# Patient Record
Sex: MALE | Race: WHITE
[De-identification: names, ages, dates, MRNs, and addresses within clinical notes are randomized per-mention and may not be internally consistent; named-entity substitution may affect disease eponyms.]

---

## 2019-09-17 ENCOUNTER — HOSPITAL ENCOUNTER (EMERGENCY)
Dept: HOSPITAL 96 - M.ERS | Age: 79
Discharge: HOME | End: 2019-09-17
Payer: COMMERCIAL

## 2019-09-17 VITALS — HEIGHT: 69 IN | WEIGHT: 203 LBS | BODY MASS INDEX: 30.07 KG/M2

## 2019-09-17 VITALS — SYSTOLIC BLOOD PRESSURE: 146 MMHG | DIASTOLIC BLOOD PRESSURE: 57 MMHG

## 2019-09-17 DIAGNOSIS — R51: Primary | ICD-10-CM

## 2019-09-17 LAB
ABSOLUTE BASOPHILS: 0.1 THOU/UL (ref 0–0.2)
ABSOLUTE EOSINOPHILS: 0.6 THOU/UL (ref 0–0.7)
ABSOLUTE MONOCYTES: 1 THOU/UL (ref 0–1.2)
ALBUMIN SERPL-MCNC: 3.6 G/DL (ref 3.4–5)
ALP SERPL-CCNC: 73 U/L (ref 46–116)
ALT SERPL-CCNC: 34 U/L (ref 30–65)
ANION GAP SERPL CALC-SCNC: 11 MMOL/L (ref 7–16)
APTT BLD: 30.8 SECONDS (ref 25–31.3)
AST SERPL-CCNC: 14 U/L (ref 15–37)
BASOPHILS NFR BLD AUTO: 1.1 %
BILIRUB SERPL-MCNC: 0.2 MG/DL
BUN SERPL-MCNC: 16 MG/DL (ref 7–18)
CALCIUM SERPL-MCNC: 8.8 MG/DL (ref 8.5–10.1)
CHLORIDE SERPL-SCNC: 104 MMOL/L (ref 98–107)
CK-MB MASS: 1.2 NG/ML
CO2 SERPL-SCNC: 26 MMOL/L (ref 21–32)
CREAT SERPL-MCNC: 1 MG/DL (ref 0.6–1.3)
EOSINOPHIL NFR BLD: 6.8 %
GLUCOSE SERPL-MCNC: 90 MG/DL (ref 70–99)
GRANULOCYTES NFR BLD MANUAL: 46.5 %
HCT VFR BLD CALC: 42.5 % (ref 42–52)
HGB BLD-MCNC: 14.7 GM/DL (ref 14–18)
INR PPP: 1
LYMPHOCYTES # BLD: 2.8 THOU/UL (ref 0.8–5.3)
LYMPHOCYTES NFR BLD AUTO: 33.3 %
MCH RBC QN AUTO: 30.7 PG (ref 26–34)
MCHC RBC AUTO-ENTMCNC: 34.6 G/DL (ref 28–37)
MCV RBC: 88.8 FL (ref 80–100)
MONOCYTES NFR BLD: 12.3 %
MPV: 9.6 FL. (ref 7.2–11.1)
NEUTROPHILS # BLD: 3.8 THOU/UL (ref 1.6–8.1)
NT-PRO BRAIN NAT PEPTIDE: 159 PG/ML (ref ?–300)
NUCLEATED RBCS: 0 /100WBC
PLATELET COUNT*: 230 THOU/UL (ref 150–400)
POTASSIUM SERPL-SCNC: 4.2 MMOL/L (ref 3.5–5.1)
PROT SERPL-MCNC: 7.4 G/DL (ref 6.4–8.2)
PROTHROMBIN TIME: 10.7 SECONDS (ref 9.2–11.5)
RBC # BLD AUTO: 4.79 MIL/UL (ref 4.5–6)
RDW-CV: 14.2 % (ref 10.5–14.5)
SODIUM SERPL-SCNC: 141 MMOL/L (ref 136–145)
TROPONIN-I LEVEL: <0.06 NG/ML (ref ?–0.06)
WBC # BLD AUTO: 8.3 THOU/UL (ref 4–11)

## 2019-09-18 NOTE — EKG
[place] [address]
Phone:  [phone]                     ELECTROCARDIOGRAM REPORT      
_______________________________________________________________________________
 
Name:       SERA VIEIRA                   [id number]      Account #:      [id number]  
Admission:  19     Attend Phys:                         
Discharge:  19     Date of Birth:  [birth year]  
         Report #: [id number]
    [id number]
_______________________________________________________________________________
THIS REPORT FOR:  //name//                      
 
                         [facility] ED
                                       
Test Date:    2019               Test Time:    17:18:04
Pat Name:     SERA VIEIRA             Department:   
Patient ID:   [id number]            Room:          
Gender:       M                        Technician:   
:          [id number]               Requested By: Simon Duran
Order Number: [id number] MD:   Clovis Germain
                                 Measurements
Intervals                              Axis          
Rate:         69                       P:            -11
FL:           135                      QRS:          -7
QRSD:         94                       T:            14
QT:           407                                    
QTc:          436                                    
                           Interpretive Statements
Sinus rhythm
Supraventricular bigeminy
Low voltage, precordial leads
Abnormal R-wave progression, early transition
No previous ECG available for comparison
 
Electronically Signed On 2019 10:13:08 CDT by Clovis Germain
[email or web address]
 
 
 
 
 
 
 
 
 
 
 
 
 
 
 
 
 
  <ELECTRONICALLY SIGNED>
                                           By: Clovis Germain MD, [facility]      
  19     1013
D: 19 1718   _____________________________________
T: 19 1718   Clovis Germain MD, FAC        /EPI normal... Well appearing, well nourished, awake, alert, oriented to person, place, time/situation and in no apparent distress.

## 2020-02-15 ENCOUNTER — HOSPITAL ENCOUNTER (INPATIENT)
Dept: HOSPITAL 96 - M.ERS | Age: 80
LOS: 2 days | Discharge: HOME HEALTH | DRG: 195 | End: 2020-02-17
Attending: INTERNAL MEDICINE | Admitting: INTERNAL MEDICINE
Payer: COMMERCIAL

## 2020-02-15 VITALS — SYSTOLIC BLOOD PRESSURE: 150 MMHG | DIASTOLIC BLOOD PRESSURE: 83 MMHG

## 2020-02-15 VITALS — SYSTOLIC BLOOD PRESSURE: 137 MMHG | DIASTOLIC BLOOD PRESSURE: 77 MMHG

## 2020-02-15 VITALS — SYSTOLIC BLOOD PRESSURE: 170 MMHG | DIASTOLIC BLOOD PRESSURE: 96 MMHG

## 2020-02-15 VITALS — WEIGHT: 199.9 LBS | HEIGHT: 65.98 IN | BODY MASS INDEX: 32.13 KG/M2

## 2020-02-15 VITALS — SYSTOLIC BLOOD PRESSURE: 136 MMHG | DIASTOLIC BLOOD PRESSURE: 67 MMHG

## 2020-02-15 DIAGNOSIS — J10.08: Primary | ICD-10-CM

## 2020-02-15 DIAGNOSIS — F03.90: ICD-10-CM

## 2020-02-15 DIAGNOSIS — I10: ICD-10-CM

## 2020-02-15 DIAGNOSIS — J12.9: ICD-10-CM

## 2020-02-15 DIAGNOSIS — Z79.899: ICD-10-CM

## 2020-02-15 DIAGNOSIS — J98.01: ICD-10-CM

## 2020-02-15 DIAGNOSIS — G40.909: ICD-10-CM

## 2020-02-15 DIAGNOSIS — N40.0: ICD-10-CM

## 2020-02-15 DIAGNOSIS — R53.81: ICD-10-CM

## 2020-02-15 DIAGNOSIS — G47.00: ICD-10-CM

## 2020-02-15 LAB
ABSOLUTE BASOPHILS: 0.1 THOU/UL (ref 0–0.2)
ABSOLUTE EOSINOPHILS: 0.1 THOU/UL (ref 0–0.7)
ABSOLUTE MONOCYTES: 1.1 THOU/UL (ref 0–1.2)
ALBUMIN SERPL-MCNC: 3.7 G/DL (ref 3.4–5)
ALP SERPL-CCNC: 72 U/L (ref 46–116)
ALT SERPL-CCNC: 52 U/L (ref 30–65)
ANION GAP SERPL CALC-SCNC: 11 MMOL/L (ref 7–16)
AST SERPL-CCNC: 33 U/L (ref 15–37)
BASOPHILS NFR BLD AUTO: 2 %
BILIRUB SERPL-MCNC: 0.6 MG/DL
BILIRUB UR-MCNC: NEGATIVE MG/DL
BUN SERPL-MCNC: 14 MG/DL (ref 7–18)
CALCIUM SERPL-MCNC: 8.4 MG/DL (ref 8.5–10.1)
CHLORIDE SERPL-SCNC: 100 MMOL/L (ref 98–107)
CO2 SERPL-SCNC: 27 MMOL/L (ref 21–32)
COLOR UR: (no result)
CREAT SERPL-MCNC: 0.9 MG/DL (ref 0.6–1.3)
EOSINOPHIL NFR BLD: 1 %
GLUCOSE SERPL-MCNC: 108 MG/DL (ref 70–99)
GRANULOCYTES NFR BLD MANUAL: 51 %
HCT VFR BLD CALC: 42.2 % (ref 42–52)
HGB BLD-MCNC: 14.6 GM/DL (ref 14–18)
KETONES UR STRIP-MCNC: NEGATIVE MG/DL
LYMPHOCYTES # BLD: 1.6 THOU/UL (ref 0.8–5.3)
LYMPHOCYTES NFR BLD AUTO: 25 %
MCH RBC QN AUTO: 30.3 PG (ref 26–34)
MCHC RBC AUTO-ENTMCNC: 34.5 G/DL (ref 28–37)
MCV RBC: 87.7 FL (ref 80–100)
MONOCYTES NFR BLD: 19 %
MPV: 9.1 FL. (ref 7.2–11.1)
NEUTROPHILS # BLD: 3 THOU/UL (ref 1.6–8.1)
NUCLEATED RBCS: 0 /100WBC
PLATELET # BLD EST: ADEQUATE 10*3/UL
PLATELET COUNT*: 174 THOU/UL (ref 150–400)
POTASSIUM SERPL-SCNC: 4.3 MMOL/L (ref 3.5–5.1)
PROT SERPL-MCNC: 8 G/DL (ref 6.4–8.2)
PROT UR QL STRIP: NEGATIVE
RBC # BLD AUTO: 4.82 MIL/UL (ref 4.5–6)
RBC # UR STRIP: (no result) /UL
RBC MORPH BLD: NORMAL
RDW-CV: 14 % (ref 10.5–14.5)
SODIUM SERPL-SCNC: 138 MMOL/L (ref 136–145)
SP GR UR STRIP: <= 1.005 (ref 1–1.03)
URINE CLARITY: CLEAR
URINE GLUCOSE-RANDOM: NEGATIVE
URINE LEUKOCYTES-REFLEX: NEGATIVE
URINE NITRITE-REFLEX: NEGATIVE
UROBILINOGEN UR STRIP-ACNC: 0.2 E.U./DL (ref 0.2–1)
VARIANT LYMPHS NFR BLD MANUAL: 2 %
WBC # BLD AUTO: 5.9 THOU/UL (ref 4–11)

## 2020-02-16 VITALS — SYSTOLIC BLOOD PRESSURE: 144 MMHG | DIASTOLIC BLOOD PRESSURE: 73 MMHG

## 2020-02-16 VITALS — SYSTOLIC BLOOD PRESSURE: 136 MMHG | DIASTOLIC BLOOD PRESSURE: 59 MMHG

## 2020-02-16 VITALS — SYSTOLIC BLOOD PRESSURE: 165 MMHG | DIASTOLIC BLOOD PRESSURE: 75 MMHG

## 2020-02-16 VITALS — DIASTOLIC BLOOD PRESSURE: 70 MMHG | SYSTOLIC BLOOD PRESSURE: 152 MMHG

## 2020-02-17 VITALS — SYSTOLIC BLOOD PRESSURE: 153 MMHG | DIASTOLIC BLOOD PRESSURE: 64 MMHG

## 2020-02-17 VITALS — DIASTOLIC BLOOD PRESSURE: 64 MMHG | SYSTOLIC BLOOD PRESSURE: 153 MMHG
